# Patient Record
Sex: FEMALE | Race: WHITE | NOT HISPANIC OR LATINO | Employment: STUDENT | URBAN - METROPOLITAN AREA
[De-identification: names, ages, dates, MRNs, and addresses within clinical notes are randomized per-mention and may not be internally consistent; named-entity substitution may affect disease eponyms.]

---
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
## 2022-01-25 ENCOUNTER — DOCUMENTATION ONLY (OUTPATIENT)
Dept: FAMILY MEDICINE | Facility: CLINIC | Age: 22
End: 2022-01-25
Payer: COMMERCIAL

## 2022-01-27 NOTE — PROGRESS NOTES
[facility] ATHLETIC MEDICINE  [facility]   Sport Psychology Progress Note      Date of Visit: 1/25/22  Duration of Session: 45-50 minutes    Lia De La Cruz presented on time for initial telehealth/videoconference. Lia De La Cruz was located at the following address for the appointment: [address] [facility] [address]     Emergency contacts provided:  General: Jocelyn FARAZChrisLuz Elena, James, [phone]  In-person: Cesar Meeks, [phone]     The risks and benefits of telehealth and what to do if there is a break in the connection were reviewed. Physical environment/space was confirmed to be secure and private on both ends. The session was both audio and visual on [employer], a secure system that is HIPAA compliant/certified. The telehealth consent form was signed prior to the session; see signed form in chart. The nature and limits of confidentiality, were discussed and Lia De La Cruz stated understanding and consent.      Suicide Assessment:  Recent suicidal thoughts: No  Past suicidal thoughts: No  Any attempts in the past: No  Any family/friends/loved ones die by suicide: No  Plan or considering various methods: No  Access to guns: No  Protective factors: no h/o suicide attempt, no plan or intent, no h/o risky impulsive behavior and h/o seeking help when needed      Mental Status & Observations:  Lia appeared generally alert and oriented. Dress was appropriate to the weather and occasion. Grooming and hygiene were appropriate. Eye contact was good. Speech was of normal volume and normal. Mood was appropriate with congruent affect. Thought processes were relevant, logical and goal-directed. Thought content was within normal limits with no evidence of psychotic or paranoid features. Memory appeared intact. Insight and judgment appeared age appropriate with good focus in session.  She exhibited normal motor activity during the appointment.  Behavior was cooperative and open.      Observations  and response to counseling:  Miles presents with good insight re: the difficulties she is currently navigating and showcases increased interest in how she can better support others while managing her personal boundaries.     Intervention:  Clt expressed difficulties trying to support a close friend/roommate with severe mental health concerns. Clt shared details re: a recent experience that got out of hand when trying to support a friend with active SI. Clt sought support/advice on how to help her friend while trying to support herself with the stress/impact this person has had on her mentally/physically. Writer prvd resources for supporting her friend as well as expressed importance of her feeling safe in her own home.       Therapy objectives/goals:  Provide support  Dealing with friends with MH concerns    Therapy follow-up plan:  Individual counseling sessions monthly (bi-monthly)    Clt's next session was scheduled for Tuesday, 2/8 @ 2 PM.      Bela Odell MA, LPC

## 2022-02-15 ENCOUNTER — DOCUMENTATION ONLY (OUTPATIENT)
Dept: FAMILY MEDICINE | Facility: CLINIC | Age: 22
End: 2022-02-15
Payer: COMMERCIAL

## 2022-02-16 NOTE — PROGRESS NOTES
[facility] ATHLETIC MEDICINE  [facility]   Sport Psychology Progress Note      Date of Visit: 2/15/22  Duration of Session: 45-50 minutes    Lia De La Cruz presented on time for initial telehealth/videoconference. Lia De La Cruz was located at the following address for the appointment: [address] [facility] [address]     Emergency contacts provided:  General: Jocelyn FARAZChrisLuz Elena, James, [phone]  In-person: Cesar Meeks, [phone]     The risks and benefits of telehealth and what to do if there is a break in the connection were reviewed. Physical environment/space was confirmed to be secure and private on both ends. The session was both audio and visual on [employer], a secure system that is HIPAA compliant/certified. The telehealth consent form was signed prior to the session; see signed form in chart. The nature and limits of confidentiality, were discussed and Lia De La Cruz stated understanding and consent.      Suicide Assessment:  Recent suicidal thoughts: No  Past suicidal thoughts: No  Any attempts in the past: No  Any family/friends/loved ones die by suicide: No  Plan or considering various methods: No  Access to guns: No  Protective factors: no h/o suicide attempt, no plan or intent, no h/o risky impulsive behavior and h/o seeking help when needed      Mental Status & Observations:  Lia appeared generally alert and oriented. Dress was appropriate to the weather and occasion. Grooming and hygiene were appropriate. Eye contact was good. Speech was of normal volume and normal. Mood was appropriate with congruent affect. Thought processes were relevant, logical and goal-directed. Thought content was within normal limits with no evidence of psychotic or paranoid features. Memory appeared intact. Insight and judgment appeared age appropriate with good focus in session.  She exhibited normal motor activity during the appointment.  Behavior was cooperative and open.      Observations  and response to counseling:  Clt presents with good insight re: her difficulties with finding balance between sport/life and showcases increased vulnerability/engagement in session.     Intervention:  Clt prvd brief update re: her friend discussed last session and shared no new update as he has remained back home and not on campus. Clt spent most of the session sharing difficulties of finding balance between sport and life with restrictions/rules. Clt analyzed her experience from this past season and explored potential options to help bring more balance and enjoyment to her overall experience.       Therapy objectives/goals:  Provide support  Balance between sport/life  Communication skills  Identity development    Therapy follow-up plan:  Individual counseling sessions monthly (bi-monthly)    Clt's next session was scheduled for Tuesday, 3/15 @ 2 PM.      Bela Odell MA, LPC

## 2022-04-01 ENCOUNTER — APPOINTMENT (OUTPATIENT)
Dept: FAMILY MEDICINE | Facility: CLINIC | Age: 22
End: 2022-04-01
Payer: COMMERCIAL

## 2022-05-03 ENCOUNTER — DOCUMENTATION ONLY (OUTPATIENT)
Dept: FAMILY MEDICINE | Facility: CLINIC | Age: 22
End: 2022-05-03
Payer: COMMERCIAL

## 2022-05-03 NOTE — PROGRESS NOTES
[facility] ATHLETICS  [facility] initial assessment note  Date of service performed: 5/3/22    Concern: Chronic injury  Body part: Shoulder  Description: Right  Injury: Strain  Type: Athletics related  Date of injury: Sx reported 5/3/22    S: S: Pt c/o R shoulder p! Started 2.5 wks ago.  Pt noticed p! Increased yesterday during power set,  noted that the pt s stroke was sloping in a compensation pattern.  Pt has hx of impingement on this shoulder in high school, lasted 2-3 months and was resolved with band exercises.     Pt describes p! As  very tolerable , and it has never held her back from any activity, but it is enough of a nuisance to mention it today.  Pt is unable to pinpoint a specific location of p!.  Stated that sometimes p! Is  on top  and  never deep pain, more all over soreness     O: Pt is not TTP over any soft tissue or bony landmarks today.  Distal Pulse intact.  Cap refill WNL.  Visibly FWD shoulder posture.     ROM    Active ROM - all motions pain free, appear equal bilat      Scapular Movement w GH Flexion - Normal   Scapular Movement w GH Abduction - Normal      Passive ROM - all motions pain free     L ER = 109  R ER = 108    L IR = 69  R IR = 68    L F = 171  R F = 176    Strength 5/5 for all motions     Special Tests    [+] Forward shoulder posture   [-] Drop    [-] Louis   [-] O briens   [-] Speeds    [-] Neers   [-] Apprehension    A: Rotator cuff pathology with Forward shoulder posture and slight weakness on R side compared bilat.  May also indicate mild impingement despite negative special tests based on posture and overall RC soreness, especially when muscles are fatigued.  I suspect that this pt has learned to compensate so that she is able to complete her physical activities to full ability despite forward shoulder, tremendous pec major tension, and lack of strength in periscapular muscles.     P: Pt was taught self stretches today, she is scheduled to complete  shoulder rehab under supervision of Dr Colin Stapleton in the [facility] on Friday May 6 @ 1pm.  She will f/u with me again on May 11 @ 12pm.      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     Anh Rodas, ATC

## 2022-09-09 ENCOUNTER — DOCUMENTATION ONLY (OUTPATIENT)
Dept: FAMILY MEDICINE | Facility: CLINIC | Age: 22
End: 2022-09-09

## 2022-09-09 NOTE — PROGRESS NOTES
[facility] ATHLETIC MEDICINE  [facility]   Sport Psychology Progress Note      Date of Visit: 9/9/22  Duration of Session: 45-50 minutes  Location: [facility] room #111       Emergency contacts provided:  General: Jocelyn Blue, James, [phone]  In-person: Cesar Meeks, [phone]      Suicide Assessment:  Recent suicidal thoughts: No  Past suicidal thoughts: No  Any attempts in the past: No  Any family/friends/loved ones die by suicide: No  Plan or considering various methods: No  Access to guns: No  Protective factors: no h/o suicide attempt, no plan or intent, no h/o risky impulsive behavior and h/o seeking help when needed      Mental Status & Observations:  Lia appeared generally alert and oriented. Dress was appropriate to the weather and occasion. Grooming and hygiene were appropriate. Eye contact was good. Speech was of normal volume and normal. Mood was appropriate with congruent affect. Thought processes were relevant, logical and goal-directed. Thought content was within normal limits with no evidence of psychotic or paranoid features. Memory appeared intact. Insight and judgment appeared age appropriate with good focus in session.  She exhibited normal motor activity during the appointment.  Behavior was cooperative and open.      Observations and response to counseling:  Though clt presents with fair insight re: her new presenting concern clt showcases increased anxiousness/stress with trying to deal with/cope with it on a daily basis.     Intervention:  Miles prvd update re: her summer and tough transition back to school/swimming. Clt explored the various areas of her life that have been impacted by this stress/anxiety including sleep, motivation, mood, and social. Clt expressed conflicting thoughts/feeligns re: peoples varied support. Clt questioned her why/purpose with sport due to the compromises/sacrifices she is currently making.      Therapy objectives/goals:  Provide  support  Balance between sport/life  Identity development  Burnout    Therapy follow-up plan:  Individual counseling sessions weekly    Clt's next sessions were scheduled for Friday, 9/23 @ 8 AM and Friday 9/30 @ 8 AM.      Bela Odell MA, LPC

## 2022-09-21 ENCOUNTER — DOCUMENTATION ONLY (OUTPATIENT)
Dept: FAMILY MEDICINE | Facility: CLINIC | Age: 22
End: 2022-09-21

## 2022-09-21 NOTE — PROGRESS NOTES
[facility] ATHLETICS  [facility] initial assessment note  Date of service performed: 9/21/2022    Concern: Acute injury  Body part: Elbow  Description: Right  Injury: Strain  Type: Athletics related  Date of injury: 9/19/2022    S: Pt reported to [facility] today for evaluation of her Right Elbow.  She has been having trouble fully extending her elbow since Monday 9/19 after training weights and reports that after today's weight session, it felt worse. Pt also has hx of shoulder pain on Right side, and met with PT, Colin Stapleton, yesterday for follow up / rehab.     O: Pt is cautiously guarding, holding elbow against her abdomen,  Reluctant to relax it fully.      Pt tender to palpate over distal biceps tendon, especially at distal musculotendinous junction.  No swelling or gross deformity,  No discoloration.  No radiating sx, no numbness or tingling.      ROM   PROM     Elbow flexion full, p! Free, WNL    Elbow extension, p! @ end range, WNL   AROM    Elbow flexion p! Through midrange of motion, Full, WNL    Elbow extension, p! @ end range, WNL    Strength    4/5 Biceps (elbow flexion)   4+/5 Biceps (shoulder flexion)   5/5 Triceps    5/5 Shoulder abduction   5/5 Shoulder adduction   5/5 Shoulder IR   5/5 Shoulder ER    5/5  Strength equal bilat   5/5 Wrist flexion   5/5 Wrist extension    Special Tests   [-] Valgus Stress   [-] Varus Stress    [-] Moving Valgus   [-] Hook   [-] Ludingtons - uncomfortable, but no biceps rupture.    A: Distal biceps strain, grade 1-2    P: Modify swimming practice today, f/u with ATC after swim practice for taping and ice.  F/u again tomorrow afternoon before PM Swim.     Anh Rodas, CHATO

## 2022-09-22 NOTE — PROGRESS NOTES
[facility] ATHLETICS  [facility] ATC follow-up note  Date of service performed: 9/22/2022    Concern/injury: Right Biceps Strain     Assessment/plan: Pt took this morning off from practice, iced her arm between 10-11 AM. Followed up with ATC @ 3:30 PM in [facility].      Motion is better today - she has not used any anti-inflammatories but is open to taking Aleve today.  Pt reported she felt she could better relax her arm when it was taped yesterday (out of full extension)     Same tape applied today (stronger to withstand swim practice).  We also did 5 min pulsed ultrasound with diclofenac lotion and 5 min of light IASTM distal R biceps / brachialis / wrist flexor bundle.     Pt will swim tonight as tolerated, f/u with ATC after practice.     Anh Rodas, ATC

## 2022-09-26 NOTE — PROGRESS NOTES
[facility] ATHLETICS  [facility] ATC follow-up note  Date of service performed: 9/26/2022    Concern/injury: Right Biceps Strain    Assessment/plan: Pt has not followed up with ATC since 9/22.  She has been training fully 9/23, 9/24 and today 9/26, has not reported any complaints, has not checked in at all with medical staff.     Anh Rodas, ATC

## 2024-04-24 ENCOUNTER — MEDICAL CORRESPONDENCE (OUTPATIENT)
Dept: HEALTH INFORMATION MANAGEMENT | Facility: CLINIC | Age: 24
End: 2024-04-24
Payer: COMMERCIAL

## 2024-04-25 ENCOUNTER — TRANSCRIBE ORDERS (OUTPATIENT)
Dept: OTHER | Age: 24
End: 2024-04-25

## 2024-04-25 DIAGNOSIS — L65.9 HAIR LOSS: Primary | ICD-10-CM
